# Patient Record
Sex: FEMALE | Race: BLACK OR AFRICAN AMERICAN | NOT HISPANIC OR LATINO | Employment: OTHER | URBAN - METROPOLITAN AREA
[De-identification: names, ages, dates, MRNs, and addresses within clinical notes are randomized per-mention and may not be internally consistent; named-entity substitution may affect disease eponyms.]

---

## 2018-10-08 NOTE — PATIENT DISCUSSION
Surgery Counseling: I have discussed the option of scheduling surgery versus following, as well as the risks, benefits and alternatives of cataract surgery with the patient. It was explained that the surgery is medically indicated at this time, and it can be performed at the patient's option as delaying will cause no further deterioration, therefore there is no rush and there is no harm in waiting to have surgery. It was also explained that there is no guarantee that removing the cataract will improve their vision. The patient understands and desires to proceed with cataract surgery with the implantation of an intraocular lens to improve vision for ___DRIVING___. I have given the patient the prescribed regimen of the all-in-one drop to use before and after cataract surgery. They have elected to use the all-in-one option of Pred/Gati/Brom(prednisolone acetate,gatifloxacin,and bromfenac. Patient to administer as directed.

## 2018-10-08 NOTE — PATIENT DISCUSSION
CATARACTS, OU - VISUALLY SIGNIFICANT. SCHEDULE _OS_ FIRST THEN LATER IN _OD_ DISCUSSED OPTION OF _STD OU_______________VS _STD/LENSX________________. PATIENT UNDERSTANDS AND DESIRES ___WILL LET US KNOW ALSO DECIDES TO BE SET FOR DISTANCE OU__________________________.

## 2018-11-20 NOTE — PATIENT DISCUSSION
S/P PE IOL, _OS__. DOING WELL. CONTINUE PRED-GATI-BROM IN THE SURGICAL EYE  FOR A TOTAL OF 3 WEEKS USE THEN DISCONTINUE. PATIENT DESIRES _STD/LENSX______IOL FOR 2ND EYE. SCHEDULE CATARACT SURGERY.

## 2021-01-11 NOTE — PATIENT DISCUSSION
Posterior Capsular Fibrosis Counseling: The diagnosis of posterior capsular fibrosis (PCF), also referred to as a secondary cataract or posterior capsular opacification (PCO), was discussed with the patient. The patient understands that their symptoms and limitations are likely related to this condition. I have reviewed the risks, benefits and alternatives of  YAG laser surgery for the treatment of the fibrosis. The uncommon risk of an increase in intraocular pressure or a retinal detachment and their associated symptoms were explained to the patient. The patient understands and desires to proceed with the laser surgery to improve their vision for ____WATCHING TV_______.

## 2021-02-16 NOTE — PATIENT DISCUSSION
Pre-Op 2nd Eye Yag Counseling: The patient has noticed an improvement in their visual symptoms in the operative eye. The patient complains of decreased vision in the fellow eye when _____DRIVING AT NIGHT____. It was explained to the patient that the decision to proceed with Yag laser in the fellow eye is entirely a separate decision from the surgical eye. All of the same risks, benefits and alternatives ere reviewed with the patient again. The patient does feel the vision in the non-operative eye is limiting their daily activities and elects to proceed with Yag laser in the ___RIGHT__ eye.

## 2021-03-17 NOTE — PATIENT DISCUSSION
Group Topic: BH Grief/Loss    Date: 7/28/2020  Start Time: 1330  End Time: 1445  Facilitators: Mikki Lawton CNA    Focus: Stages of Grief  Number in attendance: 11    Method: Group  Attendance: Present  Participation: Active  Patient Response: Appropriate feedback and Attentive  Mood: Normal  Affect: Type: Euthymic (normal mood)   Range: Full (normal)   Congruency: Congruent   Stability: Stable  Behavior/Socialization: Appropriate to group and Cooperative  Thought Process: [place] thinking and Demonstrated insight  Task Performance: Follows directions  Patient Evaluation: Independent - full participation   Lens Material:

## 2021-07-20 ENCOUNTER — NEW PATIENT COMPREHENSIVE (OUTPATIENT)
Dept: URBAN - METROPOLITAN AREA CLINIC 35 | Facility: CLINIC | Age: 86
End: 2021-07-20

## 2021-07-20 DIAGNOSIS — H52.01: ICD-10-CM

## 2021-07-20 DIAGNOSIS — E11.9: ICD-10-CM

## 2021-07-20 PROCEDURE — 92015 DETERMINE REFRACTIVE STATE: CPT

## 2021-07-20 PROCEDURE — 92004 COMPRE OPH EXAM NEW PT 1/>: CPT

## 2021-07-20 ASSESSMENT — VISUAL ACUITY
OS_CC: J2-
OS_SC: 20/20-1
OD_CC: J3
OD_SC: J10
OD_PH: 20/40+2
OS_CC: 20/25-1
OD_SC: 20/40-1
OS_SC: J10

## 2021-07-20 ASSESSMENT — TONOMETRY
OS_IOP_MMHG: 15
OD_IOP_MMHG: 14

## 2022-11-29 ENCOUNTER — COMPREHENSIVE EXAM (OUTPATIENT)
Dept: URBAN - METROPOLITAN AREA CLINIC 35 | Facility: CLINIC | Age: 87
End: 2022-11-29

## 2022-11-29 DIAGNOSIS — H52.01: ICD-10-CM

## 2022-11-29 DIAGNOSIS — E11.9: ICD-10-CM

## 2022-11-29 DIAGNOSIS — H52.201: ICD-10-CM

## 2022-11-29 DIAGNOSIS — H52.4: ICD-10-CM

## 2022-11-29 PROCEDURE — 92014 COMPRE OPH EXAM EST PT 1/>: CPT

## 2022-11-29 PROCEDURE — 92134 CPTRZ OPH DX IMG PST SGM RTA: CPT

## 2022-11-29 PROCEDURE — 92015 DETERMINE REFRACTIVE STATE: CPT

## 2022-11-29 ASSESSMENT — VISUAL ACUITY
OS_CC: 20/20-1
OD_CC: J1
OD_SC: 20/40
OD_CC: 20/25
OS_CC: J1
OS_SC: 20/25

## 2022-11-29 ASSESSMENT — TONOMETRY
OD_IOP_MMHG: 10
OS_IOP_MMHG: 10
